# Patient Record
Sex: MALE | URBAN - METROPOLITAN AREA
[De-identification: names, ages, dates, MRNs, and addresses within clinical notes are randomized per-mention and may not be internally consistent; named-entity substitution may affect disease eponyms.]

---

## 2018-10-23 ENCOUNTER — TELEPHONE (OUTPATIENT)
Dept: FAMILY MEDICINE CLINIC | Facility: CLINIC | Age: 58
End: 2018-10-23

## 2018-10-23 NOTE — TELEPHONE ENCOUNTER
Dr Ekaterina Zavala is listed as PCP. Pt has never been seen in our office. Please reach out to p, and assist pt in scheduling an appt to establish care. If she is seeing another PCP, please get the name of the  PCP. Routed to front staff.

## 2019-04-17 ENCOUNTER — TELEPHONE (OUTPATIENT)
Dept: FAMILY MEDICINE CLINIC | Facility: CLINIC | Age: 59
End: 2019-04-17

## 2019-04-17 NOTE — TELEPHONE ENCOUNTER
Patient has not been seen here at the office and Dr. Rosario Perales is listed as his PCP, can we please reach out to patient so we can Establish Care.

## 2019-05-08 ENCOUNTER — TELEPHONE (OUTPATIENT)
Dept: FAMILY MEDICINE CLINIC | Facility: CLINIC | Age: 59
End: 2019-05-08

## 2019-05-08 NOTE — TELEPHONE ENCOUNTER
Sent amended 3 tries letter o address to see if still under the care of Dr. Toby Portillo and to schedule an appt if they are.

## 2023-01-03 ENCOUNTER — OFFICE VISIT (OUTPATIENT)
Dept: INTERNAL MEDICINE CLINIC | Facility: CLINIC | Age: 63
End: 2023-01-03
Payer: COMMERCIAL

## 2023-01-03 VITALS
BODY MASS INDEX: 34.49 KG/M2 | SYSTOLIC BLOOD PRESSURE: 122 MMHG | DIASTOLIC BLOOD PRESSURE: 64 MMHG | OXYGEN SATURATION: 99 % | HEIGHT: 65 IN | RESPIRATION RATE: 18 BRPM | HEART RATE: 70 BPM | WEIGHT: 207 LBS

## 2023-01-03 DIAGNOSIS — R30.0 DYSURIA: Primary | ICD-10-CM

## 2023-01-03 LAB
APPEARANCE: CLEAR
BILIRUB UR QL STRIP.AUTO: NEGATIVE
BILIRUBIN: NEGATIVE
CLARITY UR REFRACT.AUTO: CLEAR
COLOR UR AUTO: YELLOW
GLUCOSE (URINE DIPSTICK): NEGATIVE MG/DL
GLUCOSE UR STRIP.AUTO-MCNC: NEGATIVE MG/DL
KETONES (URINE DIPSTICK): NEGATIVE MG/DL
KETONES UR STRIP.AUTO-MCNC: NEGATIVE MG/DL
LEUKOCYTE ESTERASE UR QL STRIP.AUTO: NEGATIVE
LEUKOCYTES: NEGATIVE
MULTISTIX LOT#: ABNORMAL NUMERIC
NITRITE UR QL STRIP.AUTO: NEGATIVE
NITRITE, URINE: NEGATIVE
PH UR STRIP.AUTO: 6 [PH] (ref 5–8)
PH, URINE: 5.5 (ref 4.5–8)
PROT UR STRIP.AUTO-MCNC: NEGATIVE MG/DL
PROTEIN (URINE DIPSTICK): NEGATIVE MG/DL
SP GR UR STRIP.AUTO: <=1.005 (ref 1–1.03)
SPECIFIC GRAVITY: 1 (ref 1–1.03)
URINE-COLOR: YELLOW
UROBILINOGEN UR STRIP.AUTO-MCNC: 0.2 MG/DL
UROBILINOGEN,SEMI-QN: 0.2 MG/DL (ref 0–1.9)

## 2023-01-03 PROCEDURE — 87086 URINE CULTURE/COLONY COUNT: CPT | Performed by: FAMILY MEDICINE

## 2023-01-03 PROCEDURE — 87491 CHLMYD TRACH DNA AMP PROBE: CPT | Performed by: FAMILY MEDICINE

## 2023-01-03 PROCEDURE — 3008F BODY MASS INDEX DOCD: CPT | Performed by: FAMILY MEDICINE

## 2023-01-03 PROCEDURE — 81003 URINALYSIS AUTO W/O SCOPE: CPT | Performed by: FAMILY MEDICINE

## 2023-01-03 PROCEDURE — 87591 N.GONORRHOEAE DNA AMP PROB: CPT | Performed by: FAMILY MEDICINE

## 2023-01-03 PROCEDURE — 3078F DIAST BP <80 MM HG: CPT | Performed by: FAMILY MEDICINE

## 2023-01-03 PROCEDURE — 99203 OFFICE O/P NEW LOW 30 MIN: CPT | Performed by: FAMILY MEDICINE

## 2023-01-03 PROCEDURE — 81001 URINALYSIS AUTO W/SCOPE: CPT | Performed by: FAMILY MEDICINE

## 2023-01-03 PROCEDURE — 3074F SYST BP LT 130 MM HG: CPT | Performed by: FAMILY MEDICINE

## 2023-01-04 LAB
C TRACH DNA SPEC QL NAA+PROBE: NEGATIVE
N GONORRHOEA DNA SPEC QL NAA+PROBE: NEGATIVE

## 2023-03-07 ENCOUNTER — OFFICE VISIT (OUTPATIENT)
Dept: SURGERY | Facility: CLINIC | Age: 63
End: 2023-03-07
Payer: COMMERCIAL

## 2023-03-07 DIAGNOSIS — R30.0 DYSURIA: Primary | ICD-10-CM

## 2023-03-07 LAB
APPEARANCE: CLEAR
BILIRUBIN: NEGATIVE
GLUCOSE (URINE DIPSTICK): NEGATIVE MG/DL
KETONES (URINE DIPSTICK): NEGATIVE MG/DL
MULTISTIX LOT#: ABNORMAL NUMERIC
NITRITE, URINE: NEGATIVE
PH, URINE: 6.5 (ref 4.5–8)
PROTEIN (URINE DIPSTICK): NEGATIVE MG/DL
SPECIFIC GRAVITY: 1.02 (ref 1–1.03)
URINE-COLOR: YELLOW
UROBILINOGEN,SEMI-QN: 0.2 MG/DL (ref 0–1.9)

## 2023-03-07 PROCEDURE — 99204 OFFICE O/P NEW MOD 45 MIN: CPT | Performed by: SURGERY

## 2023-03-07 PROCEDURE — 81002 URINALYSIS NONAUTO W/O SCOPE: CPT | Performed by: SURGERY

## 2023-03-07 RX ORDER — TAMSULOSIN HYDROCHLORIDE 0.4 MG/1
0.4 CAPSULE ORAL EVERY EVENING
Qty: 90 CAPSULE | Refills: 6 | Status: SHIPPED | OUTPATIENT
Start: 2023-03-07

## 2023-03-08 NOTE — PROGRESS NOTES
Anand Cross,    I have reviewed your urine test results. Tests show no significant abnormalities (no signs of microscopic red blood cells in the urine). No changes to the plan as we had previously discussed. Please let me know if you have any questions.     Thanks,  Zach Griffith MD

## 2023-03-09 NOTE — PROGRESS NOTES
Anand Cross,    I have reviewed your urine test results. Tests show no significant abnormalities (no signs of infection in the urine). No changes to the plan as we had previously discussed. Please let me know if you have any questions.     Thanks,  Orion Garza MD

## 2023-04-17 ENCOUNTER — TELEPHONE (OUTPATIENT)
Dept: SURGERY | Facility: CLINIC | Age: 63
End: 2023-04-17

## 2023-04-17 DIAGNOSIS — R30.0 DYSURIA: Primary | ICD-10-CM

## 2023-05-02 ENCOUNTER — PROCEDURE (OUTPATIENT)
Dept: SURGERY | Facility: CLINIC | Age: 63
End: 2023-05-02

## 2023-05-02 VITALS — SYSTOLIC BLOOD PRESSURE: 157 MMHG | DIASTOLIC BLOOD PRESSURE: 87 MMHG | HEART RATE: 61 BPM

## 2023-05-02 DIAGNOSIS — N48.89 PENILE MASS: Primary | ICD-10-CM

## 2023-05-02 DIAGNOSIS — R30.0 DYSURIA: ICD-10-CM

## 2023-05-02 LAB
APPEARANCE: CLEAR
BILIRUBIN: NEGATIVE
GLUCOSE (URINE DIPSTICK): NEGATIVE MG/DL
KETONES (URINE DIPSTICK): NEGATIVE MG/DL
MULTISTIX LOT#: ABNORMAL NUMERIC
NITRITE, URINE: NEGATIVE
PH, URINE: 5.5 (ref 4.5–8)
PROTEIN (URINE DIPSTICK): NEGATIVE MG/DL
SPECIFIC GRAVITY: 1 (ref 1–1.03)
URINE-COLOR: YELLOW
UROBILINOGEN,SEMI-QN: 0.2 MG/DL (ref 0–1.9)

## 2023-05-02 PROCEDURE — 99213 OFFICE O/P EST LOW 20 MIN: CPT | Performed by: SURGERY

## 2023-05-02 PROCEDURE — 3079F DIAST BP 80-89 MM HG: CPT | Performed by: SURGERY

## 2023-05-02 PROCEDURE — 81003 URINALYSIS AUTO W/O SCOPE: CPT | Performed by: SURGERY

## 2023-05-02 PROCEDURE — 52000 CYSTOURETHROSCOPY: CPT | Performed by: SURGERY

## 2023-05-02 PROCEDURE — 3077F SYST BP >= 140 MM HG: CPT | Performed by: SURGERY

## 2023-05-02 RX ORDER — CIPROFLOXACIN 500 MG/1
500 TABLET, FILM COATED ORAL ONCE
Status: COMPLETED | OUTPATIENT
Start: 2023-05-02 | End: 2023-05-02

## 2023-05-02 RX ADMIN — CIPROFLOXACIN 500 MG: 500 TABLET, FILM COATED ORAL at 15:38:00

## 2023-05-30 ENCOUNTER — HOSPITAL ENCOUNTER (OUTPATIENT)
Dept: MRI IMAGING | Facility: HOSPITAL | Age: 63
Discharge: HOME | End: 2023-05-30
Attending: SURGERY
Payer: COMMERCIAL

## 2023-05-30 DIAGNOSIS — R30.0 DYSURIA: ICD-10-CM

## 2023-05-30 PROCEDURE — A9575 INJ GADOTERATE MEGLUMI 0.1ML: HCPCS

## 2023-05-30 PROCEDURE — 72197 MRI PELVIS W/O & W/DYE: CPT | Performed by: SURGERY

## 2023-05-30 RX ORDER — GADOTERATE MEGLUMINE 376.9 MG/ML
20 INJECTION INTRAVENOUS
Status: COMPLETED | OUTPATIENT
Start: 2023-05-30 | End: 2023-05-30

## 2023-05-30 RX ADMIN — GADOTERATE MEGLUMINE 20 ML: 376.9 INJECTION INTRAVENOUS at 20:26:00

## 2023-05-31 NOTE — PROGRESS NOTES
MRI appears normal with no suspicious periurethral masses. Returning to clinic on 6/19/23 for repeat examination.

## 2023-06-19 ENCOUNTER — OFFICE VISIT (OUTPATIENT)
Dept: SURGERY | Facility: CLINIC | Age: 63
End: 2023-06-19

## 2023-06-19 DIAGNOSIS — Z12.5 SCREENING PSA (PROSTATE SPECIFIC ANTIGEN): ICD-10-CM

## 2023-06-19 DIAGNOSIS — R30.0 DYSURIA: Primary | ICD-10-CM

## 2023-06-19 LAB
APPEARANCE: CLEAR
BILIRUBIN: NEGATIVE
GLUCOSE (URINE DIPSTICK): NEGATIVE MG/DL
KETONES (URINE DIPSTICK): NEGATIVE MG/DL
MULTISTIX LOT#: ABNORMAL NUMERIC
NITRITE, URINE: NEGATIVE
PH, URINE: 6 (ref 4.5–8)
PROTEIN (URINE DIPSTICK): NEGATIVE MG/DL
SPECIFIC GRAVITY: 1.02 (ref 1–1.03)
URINE-COLOR: YELLOW
UROBILINOGEN,SEMI-QN: 0.2 MG/DL (ref 0–1.9)

## 2023-06-19 PROCEDURE — 99214 OFFICE O/P EST MOD 30 MIN: CPT | Performed by: SURGERY

## 2023-06-19 PROCEDURE — 81003 URINALYSIS AUTO W/O SCOPE: CPT | Performed by: SURGERY

## 2023-06-19 RX ORDER — TAMSULOSIN HYDROCHLORIDE 0.4 MG/1
0.4 CAPSULE ORAL EVERY EVENING
Qty: 90 CAPSULE | Refills: 6 | Status: SHIPPED | OUTPATIENT
Start: 2023-06-19

## 2023-06-19 RX ORDER — OXYBUTYNIN CHLORIDE 10 MG/1
10 TABLET, EXTENDED RELEASE ORAL DAILY
Qty: 90 TABLET | Refills: 6 | Status: SHIPPED | OUTPATIENT
Start: 2023-06-19

## 2023-06-19 NOTE — PROGRESS NOTES
Urology Clinic Note    Primary Care Provider:  Анна Fay MD     Chief Complaint:   Dysuria, urethral pressure    HPI:   Zenaida Charles is a 63year old Serbian-speaking male with no significant past medical history referred for chronic, intermittent dysuria for the past 10+ years. He reportedly had a cystoscopy in the past but is unsure of the findings. Urinalysis on 1/3/2023 showed trace blood but no microscopic blood cells on microscopic UA. Chlamydia and gonorrhea testing were negative. At his last visit \"He mainly endorses urethral burning with voiding. He denies weak urinary stream or straining to void. He has nocturia twice per night. He occasionally has some urinary frequency. He also feels a lump around the urethra, that on examination to me feels like a calcified corporal plaque that he has near the mid penile urethra as well as another 1 on the ventral distal penis behind the glans. He denies gross hematuria. Trace blood on urinalysis today that we will send for microscopic UA. \"     The lump on the his penis is likely just related to a calcified plaque adjacent to the urethra, but could be some sort of urethral diverticulum. I performed a cystoscopy on 5/2/2023 that showed normal urethra, mildly enlarged prostate, mild trigonitis, normal bladder. I ordered a pelvic MRI and there were no suspicious periurethral masses noted. In case prostatitis is a contributing factor I started him on tamsulosin, but as of his last visit this has not really helped much. Today he feels that his symptoms are minimally improved. He denies weak urinary stream.  He denies urinary frequency or nocturia more than 0-1 times per night. His main complaint is urinary urgency and the feeling of urethral pressure and discomfort when his bladder fills up.     Urinalysis: Trace blood, small leukocyte esterase, otherwise negative    PSA:  No results found for: PSA, PERCENTPSA, PSAS, PSAULTRA, QPSA, PSATOT, Yariel Mays     History:   No past medical history on file. No past surgical history on file. No family history on file. Social History     Socioeconomic History    Marital status:    Tobacco Use    Smoking status: Never    Smokeless tobacco: Never       Medications (Active prior to today's visit):  Current Outpatient Medications   Medication Sig Dispense Refill    tamsulosin 0.4 MG Oral Cap Take 1 capsule (0.4 mg total) by mouth every evening. 90 capsule 6       Allergies:  No Known Allergies    Review of Systems:   A comprehensive 10-point review of systems was completed. Pertinent positives and negatives are noted in the the HPI. Physical Exam:   CONSTITUTIONAL: Well developed, well nourished, in no acute distress  NEUROLOGIC: Alert and oriented  HEAD: Normocephalic, atraumatic  EYES: Sclera non-icteric  ENT: Hearing intact, moist mucous membranes  NECK: No obvious goiter or masses  RESPIRATORY: Normal respiratory effort  SKIN: No evident rashes  ABDOMEN: Soft, non-tender, non-distended  GENITOURINARY: Normal phallus, calcified plaques present stable from prior at the dorsal distal shaft and at the ventral midshaft near the urethra, orthotopic meatus, normal bilateral testicles    Assessment & Plan:   Alice Grullon is a 63year old Chinese-speaking male with no significant past medical history referred for chronic, intermittent dysuria for the past 10+ years. He reportedly had a cystoscopy in the past but is unsure of the findings. Urinalysis on 1/3/2023 showed trace blood but no microscopic blood cells on microscopic UA. Chlamydia and gonorrhea testing were negative. At his last visit \"He mainly endorses urethral burning with voiding. He denies weak urinary stream or straining to void. He has nocturia twice per night. He occasionally has some urinary frequency.   He also feels a lump around the urethra, that on examination to me feels like a calcified corporal plaque that he has near the mid penile urethra as well as another 1 on the ventral distal penis behind the glans. He denies gross hematuria. Trace blood on urinalysis today that we will send for microscopic UA. \"     The lump on the his penis is likely just related to a calcified plaque adjacent to the urethra, but could be some sort of urethral diverticulum. I performed a cystoscopy on 5/2/2023 that showed normal urethra, mildly enlarged prostate, mild trigonitis, normal bladder. I ordered a pelvic MRI and there were no suspicious periurethral masses noted. In case prostatitis is a contributing factor I started him on tamsulosin, but as of his last visit this has not really helped much. Today he feels that his symptoms are minimally improved. He denies weak urinary stream.  He denies urinary frequency or nocturia more than 0-1 times per night. His main complaint is urinary urgency and the feeling of urethral pressure and discomfort when his bladder fills up. His symptoms may be related to referred pain from bladder overactivity. We will trial oxybutynin to see if this helps. Fortunately the cystoscopy and MRI were both normal and he has no significant urethral findings. The calcified plaque adjacent to the urethra may be related to his discomfort, so if this does not improve with anticholinergic therapy then may end up referring him to a Peyronie's disease specialist.    -Continue tamsulosin  -Start oxybutynin  -Return to clinic in 2-3 months for symptom check    In total, 30 minutes were spent on this patient encounter (including chart review, patient history, physical, and counseling, documentation, and communication). Alda Weaver.  Meli Cerna MD  Staff Urologist  [place] [address]  Office: [phone]

## 2023-08-22 ENCOUNTER — TELEPHONE (OUTPATIENT)
Dept: SURGERY | Facility: CLINIC | Age: 63
End: 2023-08-22

## 2023-08-23 NOTE — TELEPHONE ENCOUNTER
RN called patient. Note, this patient was last seen on 6/19/23 by Dr Steve Huang: \"We will trial oxybutynin to see if this helps. Fortunately the cystoscopy and MRI were both normal and he has no significant urethral findings. The calcified plaque adjacent to the urethra may be related to his discomfort, so if this does not improve with anticholinergic therapy then may end up referring him to a Peyronie's disease specialist.\"     -Continue tamsulosin  -Start oxybutynin  -Return to clinic in 2-3 months for symptom check    He completed his MRI on 5/31. RN called patient. Disconnected. RN called back. No answer. Left message. No current imaging order recommended at this time. Patient to follow up on 09/19/23.

## 2023-09-19 ENCOUNTER — OFFICE VISIT (OUTPATIENT)
Dept: SURGERY | Facility: CLINIC | Age: 63
End: 2023-09-19

## 2023-09-19 DIAGNOSIS — R30.0 DYSURIA: Primary | ICD-10-CM

## 2023-09-19 LAB
APPEARANCE: CLEAR
BILIRUBIN: NEGATIVE
GLUCOSE (URINE DIPSTICK): 100 MG/DL
KETONES (URINE DIPSTICK): NEGATIVE MG/DL
MULTISTIX LOT#: ABNORMAL NUMERIC
NITRITE, URINE: NEGATIVE
OCCULT BLOOD: NEGATIVE
PH, URINE: 6 (ref 4.5–8)
PROTEIN (URINE DIPSTICK): NEGATIVE MG/DL
SPECIFIC GRAVITY: 1.02 (ref 1–1.03)
URINE-COLOR: YELLOW
UROBILINOGEN,SEMI-QN: 0.2 MG/DL (ref 0–1.9)

## 2023-09-19 PROCEDURE — 81003 URINALYSIS AUTO W/O SCOPE: CPT | Performed by: SURGERY

## 2023-09-19 PROCEDURE — 99214 OFFICE O/P EST MOD 30 MIN: CPT | Performed by: SURGERY

## 2023-09-19 NOTE — PROGRESS NOTES
Urology Clinic Note    Primary Care Provider:  Frank Saldana MD     Chief Complaint:   Dysuria, urethral pressure     HPI:   Mark Eastman is a 63year old Somali-speaking male with no significant past medical history referred for chronic, intermittent dysuria for the past 10+ years. He reportedly had a cystoscopy in the past but is unsure of the findings. Urinalysis on 1/3/2023 showed trace blood but no microscopic blood cells on microscopic UA. Chlamydia and gonorrhea testing were negative. At his last visit \"He mainly endorses urethral burning with voiding. He denies weak urinary stream or straining to void. He has nocturia twice per night. He occasionally has some urinary frequency. He also feels a lump around the urethra, that on examination to me feels like a calcified corporal plaque that he has near the mid penile urethra as well as another 1 on the ventral distal penis behind the glans. He denies gross hematuria. Trace blood on urinalysis today that we will send for microscopic UA. \"     The lump on the his penis is likely just related to a calcified plaque adjacent to the urethra, but could be some sort of urethral diverticulum. I performed a cystoscopy on 5/2/2023 that showed normal urethra, mildly enlarged prostate, mild trigonitis, normal bladder. I ordered a pelvic MRI and there were no suspicious periurethral masses noted. In case prostatitis is a contributing factor I started him on tamsulosin, but as of his last visit this has not really helped much. At his last visit with me on 6/19/2023 felt that his symptoms are minimally improved. His main complaint was urinary urgency and the feeling of urethral pressure and discomfort when his bladder fills up. I started him on oxybutynin. Since his last visit he took the medications for a while and felt that they did not really help, so he stopped these medications.   Today he mainly continues to endorse urethral pain near the periurethral calcified plaque on exam.  He still has a dorsal calcified plaque and a ventral calcified plaque just under the urethra in the mid shaft. He denies bladder pain, voiding symptoms. I discussed that he could be in the active, painful phase of Peyronie's disease related to his penile plaques. Advised him to take NSAIDs for 2 weeks straight to help with his discomfort. I gave him a referral to some of the specialist in the area who treat Peyronie's disease. PSA:  No results found for: \"PSA\", \"PERCENTPSA\", \"PSAS\", \"PSAULTRA\", \"QPSA\", \"PSATOT\", \"TOTPSADX\", \"TOTPSASCREEN\"     History:   No past medical history on file. No past surgical history on file. No family history on file. Social History     Socioeconomic History    Marital status:    Tobacco Use    Smoking status: Never    Smokeless tobacco: Never       Medications (Active prior to today's visit):  Current Outpatient Medications   Medication Sig Dispense Refill    oxybutynin ER 10 MG Oral Tablet 24 Hr Take 1 tablet (10 mg total) by mouth daily. 90 tablet 6    tamsulosin 0.4 MG Oral Cap Take 1 capsule (0.4 mg total) by mouth every evening. 90 capsule 6       Allergies:  No Known Allergies    Review of Systems:   A comprehensive 10-point review of systems was completed. Pertinent positives and negatives are noted in the the HPI.     Physical Exam:   CONSTITUTIONAL: Well developed, well nourished, in no acute distress  NEUROLOGIC: Alert and oriented  HEAD: Normocephalic, atraumatic  EYES: Sclera non-icteric  ENT: Hearing intact, moist mucous membranes  NECK: No obvious goiter or masses  RESPIRATORY: Normal respiratory effort  SKIN: No evident rashes  ABDOMEN: Soft, non-tender, non-distended  GENITOURINARY: Normal phallus, palpable penile plaques at the dorsal distal shaft, ventral midshaft under the urethra, orthotopic meatus, normal bilateral testicles      Assessment & Plan:   Sherin Donovan is a 63year old Algerian-speaking male with no significant past medical history referred for chronic, intermittent dysuria for the past 10+ years. He reportedly had a cystoscopy in the past but is unsure of the findings. Urinalysis on 1/3/2023 showed trace blood but no microscopic blood cells on microscopic UA. Chlamydia and gonorrhea testing were negative. At his last visit \"He mainly endorses urethral burning with voiding. He denies weak urinary stream or straining to void. He has nocturia twice per night. He occasionally has some urinary frequency. He also feels a lump around the urethra, that on examination to me feels like a calcified corporal plaque that he has near the mid penile urethra as well as another 1 on the ventral distal penis behind the glans. He denies gross hematuria. Trace blood on urinalysis today that we will send for microscopic UA. \"     The lump on the his penis is likely just related to a calcified plaque adjacent to the urethra, but could be some sort of urethral diverticulum. I performed a cystoscopy on 5/2/2023 that showed normal urethra, mildly enlarged prostate, mild trigonitis, normal bladder. I ordered a pelvic MRI and there were no suspicious periurethral masses noted. In case prostatitis is a contributing factor I started him on tamsulosin, but as of his last visit this has not really helped much. At his last visit with me on 6/19/2023 felt that his symptoms are minimally improved. His main complaint was urinary urgency and the feeling of urethral pressure and discomfort when his bladder fills up. I started him on oxybutynin. Since his last visit he took the medications for a while and felt that they did not really help, so he stopped these medications. Today he mainly continues to endorse urethral pain near the periurethral calcified plaque on exam.  He still has a dorsal calcified plaque and a ventral calcified plaque just under the urethra in the mid shaft.   He denies bladder pain, voiding symptoms. I discussed that he could be in the active, painful phase of Peyronie's disease related to his penile plaques. Advised him to take NSAIDs for 2 weeks straight to help with his discomfort. I gave him a referral to some of the specialist in the area who treat Peyronie's disease. -NSAIDs x2 weeks  -Okay to stop tamsulosin and oxybutynin  -Referral to Peyronie's disease specialists, as below    Dr. Rasheeda Vaughn  [facility] # [phone], [facility], [address] [facility]  [phone]    Dr[facility] Stef[facility] [address]  [phone]    Dr. Perez Guard  [address] [place] [address], [facility], [address]  [phone]      In total, 30 minutes were spent on this patient encounter (including chart review, patient history, physical, and counseling, documentation, and communication). Lashell Byrd.  Cabrera Santos MD  Staff Urologist  [facility]  Office: [phone]